# Patient Record
Sex: FEMALE | Race: WHITE | Employment: PART TIME | URBAN - METROPOLITAN AREA
[De-identification: names, ages, dates, MRNs, and addresses within clinical notes are randomized per-mention and may not be internally consistent; named-entity substitution may affect disease eponyms.]

---

## 2019-08-15 ENCOUNTER — APPOINTMENT (OUTPATIENT)
Dept: GENERAL RADIOLOGY | Age: 24
End: 2019-08-15
Attending: EMERGENCY MEDICINE
Payer: COMMERCIAL

## 2019-08-15 ENCOUNTER — HOSPITAL ENCOUNTER (EMERGENCY)
Age: 24
Discharge: HOME | End: 2019-08-15
Attending: EMERGENCY MEDICINE
Payer: COMMERCIAL

## 2019-08-15 VITALS
SYSTOLIC BLOOD PRESSURE: 118 MMHG | OXYGEN SATURATION: 96 % | DIASTOLIC BLOOD PRESSURE: 85 MMHG | BODY MASS INDEX: 37.89 KG/M2 | TEMPERATURE: 98.2 F | WEIGHT: 250 LBS | HEIGHT: 68 IN | RESPIRATION RATE: 18 BRPM | HEART RATE: 110 BPM

## 2019-08-15 DIAGNOSIS — J20.9 ACUTE BRONCHITIS, UNSPECIFIED ORGANISM: Primary | ICD-10-CM

## 2019-08-15 PROCEDURE — 74011636637 HC RX REV CODE- 636/637: Performed by: EMERGENCY MEDICINE

## 2019-08-15 PROCEDURE — 71046 X-RAY EXAM CHEST 2 VIEWS: CPT

## 2019-08-15 PROCEDURE — 77030013140 HC MSK NEB VYRM -A

## 2019-08-15 PROCEDURE — 74011250637 HC RX REV CODE- 250/637: Performed by: EMERGENCY MEDICINE

## 2019-08-15 PROCEDURE — 99284 EMERGENCY DEPT VISIT MOD MDM: CPT

## 2019-08-15 PROCEDURE — 74011000250 HC RX REV CODE- 250: Performed by: EMERGENCY MEDICINE

## 2019-08-15 RX ORDER — PREDNISONE 20 MG/1
60 TABLET ORAL
Status: COMPLETED | OUTPATIENT
Start: 2019-08-15 | End: 2019-08-15

## 2019-08-15 RX ORDER — ALBUTEROL SULFATE 90 UG/1
2 AEROSOL, METERED RESPIRATORY (INHALATION)
Qty: 1 INHALER | Refills: 0 | Status: SHIPPED | OUTPATIENT
Start: 2019-08-15

## 2019-08-15 RX ORDER — DOXYCYCLINE HYCLATE 100 MG
100 TABLET ORAL
Status: COMPLETED | OUTPATIENT
Start: 2019-08-15 | End: 2019-08-15

## 2019-08-15 RX ORDER — DOXYCYCLINE HYCLATE 100 MG
100 TABLET ORAL 2 TIMES DAILY
Qty: 20 TAB | Refills: 0 | Status: SHIPPED | OUTPATIENT
Start: 2019-08-15 | End: 2019-08-25

## 2019-08-15 RX ORDER — ACETAMINOPHEN 500 MG
1000 TABLET ORAL
Status: COMPLETED | OUTPATIENT
Start: 2019-08-15 | End: 2019-08-15

## 2019-08-15 RX ORDER — PREDNISONE 10 MG/1
TABLET ORAL
Qty: 21 TAB | Refills: 0 | Status: SHIPPED | OUTPATIENT
Start: 2019-08-15

## 2019-08-15 RX ADMIN — ALBUTEROL SULFATE 1 DOSE: 2.5 SOLUTION RESPIRATORY (INHALATION) at 02:23

## 2019-08-15 RX ADMIN — PREDNISONE 60 MG: 20 TABLET ORAL at 02:23

## 2019-08-15 RX ADMIN — ACETAMINOPHEN 1000 MG: 500 TABLET ORAL at 02:23

## 2019-08-15 RX ADMIN — DOXYCYCLINE HYCLATE 100 MG: 100 TABLET, COATED ORAL at 02:48

## 2019-08-15 NOTE — ED PROVIDER NOTES
This is a 79-year-old female comes emergency room with chief complaint of shortness of breath. Patient states that she has had a nonproductive dry cough for the past couple days. Patient has had a fever to 100.7 °F.  Patient has had shortness of breath with audible wheezing at times. Patient states that she does have a history of childhood asthma. Patient states that she gets this about once a year. Patient denies any nausea or vomiting. Patient denies any chest pain. Patient denies any abdominal pain. Patient denies any urinary symptoms. The history is provided by the patient. No  was used. Shortness of Breath   This is a new problem. The current episode started 2 days ago. The problem has been gradually worsening. Associated symptoms include a fever and cough. Pertinent negatives include no headaches, no rhinorrhea, no sore throat, no ear pain, no sputum production, no wheezing, no chest pain, no vomiting, no abdominal pain, no rash, no leg pain, no leg swelling and no claudication. It is unknown what precipitated the problem. She has tried nothing for the symptoms. She has had no prior hospitalizations. She has had prior ED visits. She has had no prior ICU admissions. Associated medical issues include asthma and pneumonia. Associated medical issues do not include COPD. Past Medical History:   Diagnosis Date    Asthma     as a child       History reviewed. No pertinent surgical history. History reviewed. No pertinent family history.     Social History     Socioeconomic History    Marital status: SINGLE     Spouse name: Not on file    Number of children: Not on file    Years of education: Not on file    Highest education level: Not on file   Occupational History    Not on file   Social Needs    Financial resource strain: Not on file    Food insecurity:     Worry: Not on file     Inability: Not on file    Transportation needs:     Medical: Not on file Non-medical: Not on file   Tobacco Use    Smoking status: Never Smoker    Smokeless tobacco: Never Used   Substance and Sexual Activity    Alcohol use: Yes     Comment: occ    Drug use: No    Sexual activity: Not on file   Lifestyle    Physical activity:     Days per week: Not on file     Minutes per session: Not on file    Stress: Not on file   Relationships    Social connections:     Talks on phone: Not on file     Gets together: Not on file     Attends Taoism service: Not on file     Active member of club or organization: Not on file     Attends meetings of clubs or organizations: Not on file     Relationship status: Not on file    Intimate partner violence:     Fear of current or ex partner: Not on file     Emotionally abused: Not on file     Physically abused: Not on file     Forced sexual activity: Not on file   Other Topics Concern    Not on file   Social History Narrative    Not on file     ALLERGIES: Patient has no known allergies. Review of Systems   Constitutional: Positive for fever. Negative for appetite change, chills and unexpected weight change. HENT: Negative for ear pain, hearing loss, rhinorrhea, sore throat and trouble swallowing. Eyes: Negative for pain and visual disturbance. Respiratory: Positive for cough and shortness of breath. Negative for sputum production, chest tightness and wheezing. Cardiovascular: Negative for chest pain, palpitations, claudication and leg swelling. Gastrointestinal: Negative for abdominal distention, abdominal pain, blood in stool and vomiting. Genitourinary: Negative for dysuria, hematuria and urgency. Musculoskeletal: Negative for back pain and myalgias. Skin: Negative for rash. Neurological: Negative for dizziness, syncope, weakness, numbness and headaches. Psychiatric/Behavioral: Negative for confusion and suicidal ideas. All other systems reviewed and are negative.       Vitals:    08/15/19 0204 08/15/19 0245 08/15/19 0309   BP: (!) 177/101 133/68 118/85   Pulse: (!) 115  (!) 110   Resp: 22  18   Temp: 99.8 °F (37.7 °C)  98.2 °F (36.8 °C)   SpO2: 93% 92% 96%   Weight: 113.4 kg (250 lb)     Height: 5' 8\" (1.727 m)              Physical Exam   Constitutional: She is oriented to person, place, and time. She appears well-developed and well-nourished. No distress. HENT:   Head: Normocephalic and atraumatic. Right Ear: External ear normal.   Left Ear: External ear normal.   Nose: Nose normal.   Mouth/Throat: Oropharynx is clear and moist. No oropharyngeal exudate. Eyes: Pupils are equal, round, and reactive to light. Conjunctivae and EOM are normal. Right eye exhibits no discharge. Left eye exhibits no discharge. No scleral icterus. Neck: Normal range of motion. Neck supple. No JVD present. No tracheal deviation present. Cardiovascular: Regular rhythm, normal heart sounds and intact distal pulses. Tachycardia present. Exam reveals no gallop and no friction rub. No murmur heard. Slightly tachycardic   Pulmonary/Chest: Effort normal. No stridor. No respiratory distress. She has no decreased breath sounds. She has wheezes in the left middle field. She has no rhonchi. She has no rales. She exhibits no tenderness. Prolonged exhalation   Abdominal: Soft. Bowel sounds are normal. She exhibits no distension. There is no tenderness. There is no rebound and no guarding. Musculoskeletal: Normal range of motion. She exhibits no edema or tenderness. Neurological: She is alert and oriented to person, place, and time. She has normal strength and normal reflexes. She displays normal reflexes. No cranial nerve deficit or sensory deficit. She exhibits normal muscle tone. Coordination normal. GCS eye subscore is 4. GCS verbal subscore is 5. GCS motor subscore is 6. Skin: Skin is warm and dry. Capillary refill takes less than 2 seconds. No rash noted. She is not diaphoretic. No erythema. No pallor.    Psychiatric: She has a normal mood and affect. Her behavior is normal. Judgment and thought content normal.   Nursing note and vitals reviewed. MDM  Number of Diagnoses or Management Options  Acute bronchitis, unspecified organism:      Amount and/or Complexity of Data Reviewed  Tests in the radiology section of CPT®: ordered and reviewed    Risk of Complications, Morbidity, and/or Mortality  Presenting problems: moderate  Diagnostic procedures: low  Management options: moderate    Patient Progress  Patient progress: improved       Procedures    Chief Complaint   Patient presents with    Shortness of Breath       The patient's presenting problems have been discussed, and they are in agreement with the care plan formulated and outlined with them. I have encouraged them to ask questions as they arise throughout their visit. MEDICATIONS GIVEN:  Medications   albuterol 5mg / ipratropium 0.5mg neb solution (1 Dose Nebulization Given 8/15/19 0223)   acetaminophen (TYLENOL) tablet 1,000 mg (1,000 mg Oral Given 8/15/19 0223)   predniSONE (DELTASONE) tablet 60 mg (60 mg Oral Given 8/15/19 0223)   doxycycline (VIBRA-TABS) tablet 100 mg (100 mg Oral Given 8/15/19 0248)       LABS REVIEWED:  No results found for this or any previous visit (from the past 24 hour(s)). VITAL SIGNS:  Patient Vitals for the past 24 hrs:   Temp Pulse Resp BP SpO2   08/15/19 0309 98.2 °F (36.8 °C) (!) 110 18 118/85 96 %   08/15/19 0245    133/68 92 %   08/15/19 0204 99.8 °F (37.7 °C) (!) 115 22 (!) 177/101 93 %       RADIOLOGY RESULTS:  The following have been ordered and reviewed:  Xr Chest Pa Lat    Result Date: 8/15/2019  History: Shortness of breath. Frontal and lateral views of the chest show clear lungs. The heart, mediastinum and pulmonary vasculature are normal.  The bony thorax is unremarkable. IMPRESSION: NORMAL CHEST. PROGRESS NOTES:  Patient feeling better. We will treat as bronchitis. Discussed results and plan with patient and mother.  Patient will be discharged home with PCP and pulmonology follow up. Patient instructed to return to the emergency room for any worsening symptoms or any other concerns. DIAGNOSIS:    1. Acute bronchitis, unspecified organism        PLAN:  Follow-up Information     Follow up With Specialties Details Why Contact Info    Donny Brooks MD [facility] Schedule an appointment as soon as possible for a visit  [facility] [phone]      Den Moore MD Pulmonary Disease Schedule an appointment as soon as possible for a visit  [address]  [facility] [phone]  [phone]      [facility] EMERGENCY DEPT Emergency Medicine  If symptoms worsen [address] [facility]  [phone]        Discharge Medication List as of 8/15/2019  3:13 AM      START taking these medications    Details   doxycycline (VIBRA-TABS) 100 mg tablet Take 1 Tab by mouth two (2) times a day for 10 days. , Print, Disp-20 Tab, R-0      !! albuterol (PROVENTIL HFA, VENTOLIN HFA, PROAIR HFA) 90 mcg/actuation inhaler Take 2 Puffs by inhalation every four (4) hours as needed for Wheezing or Shortness of Breath., Print, Disp-1 Inhaler, R-0      predniSONE (STERAPRED DS) 10 mg dose pack Take as directed. , Print, Disp-21 Tab, R-0       !! - Potential duplicate medications found. Please discuss with provider. CONTINUE these medications which have NOT CHANGED    Details   !! albuterol (PROVENTIL HFA, VENTOLIN HFA, PROAIR HFA) 90 mcg/actuation inhaler Take 2 Puffs by inhalation every four (4) hours as needed for Wheezing or Shortness of Breath (cough). , Print, Disp-1 Inhaler, R-0      guaiFENesin-codeine (ROBITUSSIN AC) 100-10 mg/5 mL solution Take 5 mL by mouth three (3) times daily as needed for Cough. Max Daily Amount: 15 mL. , Print, Disp-60 mL, R-0      ipratropium-albuterol (COMBIVENT)  mcg/actuation inhaler Take 1 Puff by inhalation every six (6) hours as needed. Historical Med, 1 Puff      albuterol (PROVENTIL, VENTOLIN) 90 mcg/actuation inhaler Take 1-2 Puffs by inhalation every four (4) hours as needed for Wheezing. Print, 1-2 Puff, Disp-17 g, R-0       !! - Potential duplicate medications found. Please discuss with provider. ED COURSE: The patient's hospital course has been uncomplicated.

## 2019-08-15 NOTE — DISCHARGE INSTRUCTIONS
We hope that we have addressed all of your medical concerns. The examination and treatment you received in the Emergency Department were for an emergent problem and were not intended as complete care. It is important that you follow up with your healthcare provider(s) for ongoing care. If your symptoms worsen or do not improve as expected, and you are unable to reach your usual health care provider(s), you should return to the Emergency Department. Today's healthcare is undergoing tremendous change, and patient satisfaction surveys are one of the many tools to assess the quality of medical care. You may receive a survey from the [employer] organization regarding your experience in the Emergency Department. I hope that your experience has been completely positive, particularly the medical care that I provided. As such, please participate in the survey; anything less than excellent does not meet my expectations or intentions. [facility] and 8 [facility] participate in nationally recognized quality of care measures. If your blood pressure is greater than 120/80, as reported below, we urge that you seek medical care to address the potential of high blood pressure, commonly known as hypertension. Hypertension can be hereditary or can be caused by certain medical conditions, pain, stress, or \"white coat syndrome. \"       Please make an appointment with your health care provider(s) for follow up of your Emergency Department visit. VITALS:   Patient Vitals for the past 8 hrs:   Temp Pulse Resp BP SpO2   08/15/19 0245 -- -- -- 133/68 92 %   08/15/19 0204 99.8 °F (37.7 °C) (!) 115 22 (!) 177/101 93 %          Thank you for allowing us to provide you with medical care today. We realize that you have many choices for your emergency care needs. Please choose us in the future for any continued health care needs. Eusebio Cameron, [address] [place]: [phone]            No results found for this or any previous visit (from the past 24 hour(s)). Xr Chest Pa Lat    Result Date: 8/15/2019  History: Shortness of breath. Frontal and lateral views of the chest show clear lungs. The heart, mediastinum and pulmonary vasculature are normal.  The bony thorax is unremarkable. IMPRESSION: NORMAL CHEST. Patient Education        Bronchitis: Care Instructions  Your Care Instructions    Bronchitis is inflammation of the bronchial tubes, which carry air to the lungs. The tubes swell and produce mucus, or phlegm. The mucus and inflamed bronchial tubes make you cough. You may have trouble breathing. Most cases of bronchitis are caused by viruses like those that cause colds. Antibiotics usually do not help and they may be harmful. Bronchitis usually develops rapidly and lasts about 2 to 3 weeks in otherwise healthy people. Follow-up care is a key part of your treatment and safety. Be sure to make and go to all appointments, and call your doctor if you are having problems. It's also a good idea to know your test results and keep a list of the medicines you take. How can you care for yourself at home? · Take all medicines exactly as prescribed. Call your doctor if you think you are having a problem with your medicine. · Get some extra rest.  · Take an over-the-counter pain medicine, such as acetaminophen (Tylenol), ibuprofen (Advil, Motrin), or naproxen (Aleve) to reduce fever and relieve body aches. Read and follow all instructions on the label. · Do not take two or more pain medicines at the same time unless the doctor told you to. Many pain medicines have acetaminophen, which is Tylenol. Too much acetaminophen (Tylenol) can be harmful. · Take an over-the-counter cough medicine that contains dextromethorphan to help quiet a dry, hacking cough so that you can sleep.  Avoid cough medicines that have more than one active ingredient. Read and follow all instructions on the label. · Breathe moist air from a humidifier, hot shower, or sink filled with hot water. The heat and moisture will thin mucus so you can cough it out. · Do not smoke. Smoking can make bronchitis worse. If you need help quitting, talk to your doctor about stop-smoking programs and medicines. These can increase your chances of quitting for good. When should you call for help? Call 911 anytime you think you may need emergency care. For example, call if:    · You have severe trouble breathing.    Call your doctor now or seek immediate medical care if:    · You have new or worse trouble breathing.     · You cough up dark brown or bloody mucus (sputum).     · You have a new or higher fever.     · You have a new rash.    Watch closely for changes in your health, and be sure to contact your doctor if:    · You cough more deeply or more often, especially if you notice more mucus or a change in the color of your mucus.     · You are not getting better as expected. Where can you learn more? Go to http://megan-harrison.info/. Enter H333 in the search box to learn more about \"Bronchitis: Care Instructions. \"  Current as of: September 5, 2018  Content Version: 12.1  © 8884-4174 [employer]. Care instructions adapted under license by [employer] (which disclaims liability or warranty for this information). If you have questions about a medical condition or this instruction, always ask your healthcare professional. [place] [address] any warranty or liability for your use of this information.

## 2019-08-15 NOTE — ED TRIAGE NOTES
Triage: Has been having a cold for the past couple days.   Non- productive dry cough, low grade fever at home 100.7.  + SOB, audible wheez also reports having Asthma as a child

## 2019-08-15 NOTE — ED NOTES
Patient discharged from ED by provider. Discharge instructions reviewed with patient and all questions answered. Patient ambulatory from ED in [facility].

## 2022-01-25 ENCOUNTER — OFFICE VISIT (OUTPATIENT)
Dept: ORTHOPEDIC SURGERY | Age: 27
End: 2022-01-25

## 2022-01-25 DIAGNOSIS — M25.561 ACUTE PAIN OF RIGHT KNEE: Primary | ICD-10-CM

## 2022-01-25 PROCEDURE — 99214 OFFICE O/P EST MOD 30 MIN: CPT | Performed by: ORTHOPAEDIC SURGERY

## 2022-01-25 NOTE — PROGRESS NOTES
Ajay Gerber (: 1995) is a 27 y.o. female, patient, here for evaluation of the following chief complaint(s):  Knee Pain (knee pain)       HPI:    Patient presents the office today for evaluation of right knee pain. This is a patient. Family and with me. She is status post Linda osteotomy and MPFL reconstruction. She did relatively well through her postoperative course and still continues to be well. She has noted a little bit of which she is describing as intermittent discomfort and some crackling noise in the anterior aspect of the right knee. She has not noted any swelling. She denies giving way. She has not had any recent level of trauma. Of note she does note some tenderness over her incision line and she has felt some of the heads of the screws from the osteotomy site    No Known Allergies    Current Outpatient Medications   Medication Sig    albuterol (PROVENTIL HFA, VENTOLIN HFA, PROAIR HFA) 90 mcg/actuation inhaler Take 2 Puffs by inhalation every four (4) hours as needed for Wheezing or Shortness of Breath.  predniSONE (STERAPRED DS) 10 mg dose pack Take as directed.  albuterol (PROVENTIL HFA, VENTOLIN HFA, PROAIR HFA) 90 mcg/actuation inhaler Take 2 Puffs by inhalation every four (4) hours as needed for Wheezing or Shortness of Breath (cough).  guaiFENesin-codeine (ROBITUSSIN AC) 100-10 mg/5 mL solution Take 5 mL by mouth three (3) times daily as needed for Cough. Max Daily Amount: 15 mL.  ipratropium-albuterol (COMBIVENT)  mcg/actuation inhaler Take 1 Puff by inhalation every six (6) hours as needed.  albuterol (PROVENTIL, VENTOLIN) 90 mcg/actuation inhaler Take 1-2 Puffs by inhalation every four (4) hours as needed for Wheezing. No current facility-administered medications for this visit. Past Medical History:   Diagnosis Date    Asthma     as a child        No past surgical history on file. No family history on file.      Social History Socioeconomic History    Marital status: SINGLE     Spouse name: Not on file    Number of children: Not on file    Years of education: Not on file    Highest education level: Not on file   Occupational History    Not on file   Tobacco Use    Smoking status: Never Smoker    Smokeless tobacco: Never Used   Substance and Sexual Activity    Alcohol use: Yes     Comment: occ    Drug use: No    Sexual activity: Not on file   Other Topics Concern    Not on file   Social History Narrative    Not on file     Social Determinants of Health     Financial Resource Strain:     Difficulty of Paying Living Expenses: Not on file   Food Insecurity:     Worried About Running Out of Food in the Last Year: Not on file    Tiki of Food in the Last Year: Not on file   Transportation Needs:     Lack of Transportation (Medical): Not on file    Lack of Transportation (Non-Medical): Not on file   Physical Activity:     Days of Exercise per Week: Not on file    Minutes of Exercise per Session: Not on file   Stress:     Feeling of Stress : Not on file   Social Connections:     Frequency of Communication with Friends and Family: Not on file    Frequency of Social Gatherings with Friends and Family: Not on file    Attends Anglican Services: Not on file    Active Member of Clubs or Organizations: Not on file    Attends Club or Organization Meetings: Not on file    Marital Status: Not on file   Intimate Partner Violence:     Fear of Current or Ex-Partner: Not on file    Emotionally Abused: Not on file    Physically Abused: Not on file    Sexually Abused: Not on file   Housing Stability:     Unable to Pay for Housing in the Last Year: Not on file    Number of [place] in the Last Year: Not on file    Unstable Housing in the Last Year: Not on file   Right knee    Review of Systems   Musculoskeletal:        Knee pain       Vitals: There were no vitals taken for this visit.    There is no height or weight on file to calculate BMI. Ortho Exam     Right knee: Incision is healed. Patient has no crepitation no effusion. She has a 1 quadrant excursion with manipulation of the patella i.e. the MPFL reconstruction appears to be holding up very well. She has full range of motion of the knee with no effusion. She has no medial lateral joint line tenderness. There is some tenderness located along the incision line and there are palpable screw heads. There is no problem with the actual skin. Left knee:   No effusion. Full range of motion the knee joint. There is pain with manipulation of the patella. There is crepitation with manipulation of the patella and with range of motion. There is no medial or lateral joint line tenderness. Pattie's maneuvers negative. Collateral ligaments are intact. Anterior drawer and posterior drawer negative. There is no soft tissue swelling distally. Neurovascular examination is intact. XR Results (most recent):  Results from Appointment encounter on 01/25/22    XR KNEE RT MIN 4 V    Narrative  4 view x-ray of the right knee reveals a healed Linda osteotomy with retained hardware. There is evidence of a prior MPFL reconstruction. The patella is in appropriate location. I do not appreciate any significant signs of osteoarthritis       ASSESSMENT/PLAN:    Patient is doing well status post Linda osteotomy. 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 However, she has very little crepitation today on examination. She actually has more crepitation on the left knee. The patella seems to be tracking well. I did detect a little bit of quadricep weakness and I encouraged her to continue to work on her range of motion and strengthening exercises. At this stage, we would not recommend removal of her screws.   A very well may come to this but I believe it is too early to consider this. She is comfortable with this plan.   If things worsen, I am happy to see her back        Suki Valdovinos MD

## 2022-05-17 ENCOUNTER — OFFICE VISIT (OUTPATIENT)
Dept: ORTHOPEDIC SURGERY | Age: 27
End: 2022-05-17
Payer: COMMERCIAL

## 2022-05-17 DIAGNOSIS — Z96.9 RETAINED ORTHOPEDIC HARDWARE: Primary | ICD-10-CM

## 2022-05-17 DIAGNOSIS — Z96.9 RETAINED ORTHOPEDIC HARDWARE: ICD-10-CM

## 2022-05-17 DIAGNOSIS — M22.41 CHONDROMALACIA OF PATELLA, RIGHT: Primary | ICD-10-CM

## 2022-05-17 DIAGNOSIS — M22.41 CHONDROMALACIA OF PATELLA, RIGHT: ICD-10-CM

## 2022-05-17 PROCEDURE — 99214 OFFICE O/P EST MOD 30 MIN: CPT | Performed by: ORTHOPAEDIC SURGERY

## 2022-05-17 NOTE — PROGRESS NOTES
Dori Villegas (: 1995) is a 27 y.o. female, patient, here for evaluation of the following chief complaint(s):  Knee Pain (right knee pain)       HPI:    Patient returns today for evaluation of the right knee. She is status post right knee Linda osteotomy and MPFL reconstruction was performed May 2021. She was last seen on 2022. Since that time she states she continues to notice the\" squeaking\" from her knee when she goes from extension into flexion. She states she does not have any significant pain but that the noise is concerning to her. She is also still endorsing discomfort from her prominent screws on the proximal tibia. This mostly when she is kneeling onto her knee. She has not noted any increased swelling or warmth. No new injuries. She has been working on her range of motion and strength of the quadriceps. She has not been having to take any type of pain medications. She is hoping to discuss further treatment options for her knee. She has had no recent fever or chills. No Known Allergies    Current Outpatient Medications   Medication Sig    albuterol (PROVENTIL HFA, VENTOLIN HFA, PROAIR HFA) 90 mcg/actuation inhaler Take 2 Puffs by inhalation every four (4) hours as needed for Wheezing or Shortness of Breath.  predniSONE (STERAPRED DS) 10 mg dose pack Take as directed.  albuterol (PROVENTIL HFA, VENTOLIN HFA, PROAIR HFA) 90 mcg/actuation inhaler Take 2 Puffs by inhalation every four (4) hours as needed for Wheezing or Shortness of Breath (cough).  guaiFENesin-codeine (ROBITUSSIN AC) 100-10 mg/5 mL solution Take 5 mL by mouth three (3) times daily as needed for Cough. Max Daily Amount: 15 mL.  ipratropium-albuterol (COMBIVENT)  mcg/actuation inhaler Take 1 Puff by inhalation every six (6) hours as needed.  albuterol (PROVENTIL, VENTOLIN) 90 mcg/actuation inhaler Take 1-2 Puffs by inhalation every four (4) hours as needed for Wheezing.      No current facility-administered medications for this visit. Past Medical History:   Diagnosis Date    Asthma     as a child        No past surgical history on file. No family history on file. Social History     Socioeconomic History    Marital status: SINGLE     Spouse name: Not on file    Number of children: Not on file    Years of education: Not on file    Highest education level: Not on file   Occupational History    Not on file   Tobacco Use    Smoking status: Never Smoker    Smokeless tobacco: Never Used   Substance and Sexual Activity    Alcohol use: Yes     Comment: occ    Drug use: No    Sexual activity: Not on file   Other Topics Concern    Not on file   Social History Narrative    Not on file     Social Determinants of Health     Financial Resource Strain:     Difficulty of Paying Living Expenses: Not on file   Food Insecurity:     Worried About Running Out of Food in the Last Year: Not on file    Tiki of Food in the Last Year: Not on file   Transportation Needs:     Lack of Transportation (Medical): Not on file    Lack of Transportation (Non-Medical):  Not on file   Physical Activity:     Days of Exercise per Week: Not on file    Minutes of Exercise per Session: Not on file   Stress:     Feeling of Stress : Not on file   Social Connections:     Frequency of Communication with Friends and Family: Not on file    Frequency of Social Gatherings with Friends and Family: Not on file    Attends Church Services: Not on file    Active Member of Clubs or Organizations: Not on file    Attends Club or Organization Meetings: Not on file    Marital Status: Not on file   Intimate Partner Violence:     Fear of Current or Ex-Partner: Not on file    Emotionally Abused: Not on file    Physically Abused: Not on file    Sexually Abused: Not on file   Housing Stability:     Unable to Pay for Housing in the Last Year: Not on file    Number of [place] in the Last Year: Not on file    Unstable Housing in the Last Year: Not on file       Review of Systems   Musculoskeletal:        Knee pain       Vitals: There were no vitals taken for this visit. There is no height or weight on file to calculate BMI. Ortho Exam     General:  Well-nourished well-developed female. Alert and oriented. No acute distress. Pleasant on exam.  Nonantalgic gait. No assistive device to ambulate. Right lower extremity:  Skin is intact about the knee. She has well-healed portal sites and incisions about the knee and proximal lower leg. There is no evidence of dehiscence, ecchymosis, erythema, warmth, or signs of infection. She has no effusion. Overall alignment is maintained and symmetric to the contralateral side. With active range of motion of the knee her patella tracks without lateral translation. She is able to fully bend the knee and flex to 130 degrees. There is palpable crepitus of patellofemoral joint with active and passive range of motion the knee. Minimal discomfort patellar grind test.  The 3 screws of the proximal tibia are palpable through the skin. There is no fluctuance, ecchymosis, or erythema at the site of the screws. No evidence that the hardware is putting pressure on the skin. She is nontender palpation along the medial joint line, lateral joint line, quadriceps tendon, patellar tendon, tibial tubercle, pes anserine, Gerdy's tubercle, hamstrings, popliteal fossa, or proximal fibula. Knee is stable varus valgus stress. Negative Lachman and posterior drawer solid endpoints. Negative Pattie. No groin or knee pain with internal or external rotation of the hip. Motor and sensory intact distally in all distributions. Foot is warm well perfused. Palpable DP pulse. Compartments soft and compressible.     Radiology:  4 views of the right knee from January 25, 2022 were again reviewed today demonstrating evidence of a healed Linda tibial tubercle osteotomy with retained screws x3. Also a Endobutton on the lateral cortex of the distal femur from her MPFL reconstruction. No fractures or dislocations are appreciated. ASSESSMENT/PLAN:    22-year-old female with right lower leg pain from retained hardware and chondromalacia of the patella status post Linda osteotomy and MPFL reconstruction May 17, 2021. On discussion was had with the patient today regarding her radiographs from January, her physical exam, history. Her screws are palpable on today's exam and do cause her discomfort especially when kneeling onto the knee. Her last radiograph does show that her osteotomy site is healed without any obvious complication. We discussed leaving the screws versus removing them. The procedure, risk, benefits, alternatives, rehab were all discussed in detail. Questions were answered. She wishes to have these removed. We also discussed the crepitus at her patellofemoral joint. Explained that prior to her surgery her patella was sitting lateral which did cause some chondromalacia or softening/roughness to the bottom side of her patella. This is what is causing the squeaking crepitus noise and sensation. We discussed monitoring her symptoms and treating conservatively. We also discussed potential diagnostic arthroscopy with possible chondro plasty. At this point she would like to pursue surgical intervention. Explained that we will use the same portal sites from her prior surgery. We will do a full diagnostic arthroscopy of the knee document any chondromalacia or other intra-articular pathology and address as appropriate. This procedure, risk, benefits, alternatives, and rehab were also discussed. She is concerned about returning to work. Explained that if she were to have surgery on Tuesday she would likely be out to the following week.   However she had surgery on Friday she would likely be able to return Tuesday or Wednesday of the following week depending on her symptoms. She is happy with this plan. We will get this scheduled at her convenience. The risks and benefits were described to the patient. The patient understands there is a risk of infection, postoperative pain, numbness, tingling, stiffness DVT, PE, MI, CVA and any other unforeseen events. The patient also understands there is a long rehabilitative process that typically follows the surgical procedure. We talked about the possibility of not being able to alleviate all of the discomfort. Also, I explained  there is no guarantee all function and strength will return. The patient understands the possiblity that  implants may be utilized during this surgery. The patient also understands the generalized, associated risk of anesthetic and wishes to proceed in an elective fashion.         Monika Corneluis MD

## 2022-05-17 NOTE — LETTER
5/17/2022    Patient: Messi Phillips   YOB: 1995   Date of Visit: 5/17/2022     Lizbeth Pereira MD  [address] [facility]  [address]  Via Fax: [phone]    Dear Lizbeth Pereira MD,      Thank you for referring Ms. Patti Almeida to [facility] for evaluation. My notes for this consultation are attached. If you have questions, please do not hesitate to call me. I look forward to following your patient along with you.       Sincerely,    Arabella Aguilar MD

## 2022-06-17 DIAGNOSIS — Z98.890 S/P RIGHT KNEE ARTHROSCOPY: Primary | ICD-10-CM

## 2022-06-17 RX ORDER — OXYCODONE AND ACETAMINOPHEN 5; 325 MG/1; MG/1
1 TABLET ORAL
Qty: 40 TABLET | Refills: 0 | Status: CANCELLED | OUTPATIENT
Start: 2022-06-17 | End: 2022-06-24

## 2022-06-21 ENCOUNTER — TELEPHONE (OUTPATIENT)
Dept: ORTHOPEDIC SURGERY | Age: 27
End: 2022-06-21

## 2022-08-18 DIAGNOSIS — Z98.890 S/P RIGHT KNEE ARTHROSCOPY: Primary | ICD-10-CM

## 2023-01-13 DIAGNOSIS — Z98.890 S/P RIGHT KNEE ARTHROSCOPY: Primary | ICD-10-CM

## 2023-01-13 DIAGNOSIS — M22.41 CHONDROMALACIA OF PATELLA, RIGHT: ICD-10-CM

## 2023-02-15 DIAGNOSIS — Z98.890 S/P RIGHT KNEE ARTHROSCOPY: Primary | ICD-10-CM

## 2023-02-16 RX ORDER — OXYCODONE AND ACETAMINOPHEN 5; 325 MG/1; MG/1
1 TABLET ORAL
Qty: 30 TABLET | Refills: 0 | Status: SHIPPED | OUTPATIENT
Start: 2023-02-16 | End: 2023-02-21

## 2023-02-27 ENCOUNTER — OFFICE VISIT (OUTPATIENT)
Dept: ORTHOPEDIC SURGERY | Age: 28
End: 2023-02-27
Payer: COMMERCIAL

## 2023-02-27 DIAGNOSIS — Z98.890 S/P RIGHT KNEE ARTHROSCOPY: Primary | ICD-10-CM

## 2023-02-27 PROCEDURE — 99024 POSTOP FOLLOW-UP VISIT: CPT | Performed by: ORTHOPAEDIC SURGERY

## 2023-02-27 NOTE — LETTER
2/27/2023    Patient: Erin Erickson   YOB: 1995   Date of Visit: 2/27/2023     Obi Arizmendi MD  [address] [facility]  [address] [place] [address]  Via Fax: [phone]    Dear Obi Arizmendi MD,      Thank you for referring Ms. Feliz Hernandez to [facility] for evaluation. My notes for this consultation are attached. If you have questions, please do not hesitate to call me. I look forward to following your patient along with you.       Sincerely,    Guzman Queen MD

## 2023-02-27 NOTE — PROGRESS NOTES
Kateryna Giles (: 1995) is a 24 y.o. female, patient, here for evaluation of the following chief complaint(s):  Knee Pain (Right knee )       HPI:    Patient presents the office she is status post arthroscopy of the right knee. Patient is doing well. She is not noticed any shortness of breath or calf pain. She states her current level of pain is minimal.    No Known Allergies    Current Outpatient Medications   Medication Sig    albuterol (PROVENTIL HFA, VENTOLIN HFA, PROAIR HFA) 90 mcg/actuation inhaler Take 2 Puffs by inhalation every four (4) hours as needed for Wheezing or Shortness of Breath. predniSONE (STERAPRED DS) 10 mg dose pack Take as directed. albuterol (PROVENTIL HFA, VENTOLIN HFA, PROAIR HFA) 90 mcg/actuation inhaler Take 2 Puffs by inhalation every four (4) hours as needed for Wheezing or Shortness of Breath (cough). guaiFENesin-codeine (ROBITUSSIN AC) 100-10 mg/5 mL solution Take 5 mL by mouth three (3) times daily as needed for Cough. Max Daily Amount: 15 mL. ipratropium-albuterol (COMBIVENT)  mcg/actuation inhaler Take 1 Puff by inhalation every six (6) hours as needed. albuterol (PROVENTIL, VENTOLIN) 90 mcg/actuation inhaler Take 1-2 Puffs by inhalation every four (4) hours as needed for Wheezing. No current facility-administered medications for this visit. Past Medical History:   Diagnosis Date    Asthma     as a child        No past surgical history on file. No family history on file.      Social History     Socioeconomic History    Marital status: SINGLE     Spouse name: Not on file    Number of children: Not on file    Years of education: Not on file    Highest education level: Not on file   Occupational History    Not on file   Tobacco Use    Smoking status: Never    Smokeless tobacco: Never   Substance and Sexual Activity    Alcohol use: Yes     Comment: occ    Drug use: No    Sexual activity: Not on file   Other Topics Concern    Not on file Social History Narrative    Not on file     Social Determinants of Health     Financial Resource Strain: Not on file   Food Insecurity: Not on file   Transportation Needs: Not on file   Physical Activity: Not on file   Stress: Not on file   Social Connections: Not on file   Intimate Partner Violence: Not on file   Housing Stability: Not on file       Review of Systems   Musculoskeletal:         Right knee      Vitals: There were no vitals taken for this visit. There is no height or weight on file to calculate BMI. Ortho Exam     Right knee: Mild ecchymosis noted along the medial portal site. Minimal effusion. Incisions are all healing well. There is no drainage. Calf is soft and supple. Neurovascular examination is intact    ASSESSMENT/PLAN:    Patient is progressing well. We talked about physical therapy she would like to proceed with a physician directed home exercise program.  I think this is reasonable and appropriate. I have gone over management of her portal sites.   I have gone over exercises that would be beneficial.  She is to return to the office in 4 weeks        Jayesh Sanchez MD

## 2023-05-06 RX ORDER — ALBUTEROL SULFATE 90 UG/1
1-2 AEROSOL, METERED RESPIRATORY (INHALATION) EVERY 4 HOURS PRN
COMMUNITY
Start: 2013-02-06